# Patient Record
Sex: FEMALE
[De-identification: names, ages, dates, MRNs, and addresses within clinical notes are randomized per-mention and may not be internally consistent; named-entity substitution may affect disease eponyms.]

---

## 2022-03-11 PROBLEM — Z00.00 ENCOUNTER FOR PREVENTIVE HEALTH EXAMINATION: Status: ACTIVE | Noted: 2022-03-11

## 2022-03-17 ENCOUNTER — APPOINTMENT (OUTPATIENT)
Dept: ORTHOPEDIC SURGERY | Facility: CLINIC | Age: 67
End: 2022-03-17
Payer: COMMERCIAL

## 2022-03-17 VITALS
HEIGHT: 66 IN | BODY MASS INDEX: 28.93 KG/M2 | DIASTOLIC BLOOD PRESSURE: 67 MMHG | SYSTOLIC BLOOD PRESSURE: 168 MMHG | HEART RATE: 71 BPM | WEIGHT: 180 LBS

## 2022-03-17 DIAGNOSIS — Z78.9 OTHER SPECIFIED HEALTH STATUS: ICD-10-CM

## 2022-03-17 DIAGNOSIS — Z80.0 FAMILY HISTORY OF MALIGNANT NEOPLASM OF DIGESTIVE ORGANS: ICD-10-CM

## 2022-03-17 DIAGNOSIS — Z86.79 PERSONAL HISTORY OF OTHER DISEASES OF THE CIRCULATORY SYSTEM: ICD-10-CM

## 2022-03-17 PROCEDURE — 99204 OFFICE O/P NEW MOD 45 MIN: CPT

## 2022-03-17 NOTE — PHYSICAL EXAM
[Slightly Antalgic] : slightly antalgic [LE] : Sensory: Intact in bilateral lower extremities [DP] : dorsalis pedis 2+ and symmetric bilaterally [PT] : posterior tibial 2+ and symmetric bilaterally [Normal] : Alert and in no acute distress [Poor Appearance] : well-appearing [Acute Distress] : not in acute distress [Obese] : not obese [de-identified] : The patient has no respiratory distress. Mood and affect are normal. The patient is alert and oriented to person, place and time.\par There is no pain with active or passive motion of the hips.  There is no tenderness of either hip.  There is an anterior scar at the right knee.  Examination of the left knee demonstrates a varus alignment.  There is tenderness medially, laterally and anteriorly.  The patient has quadriceps and hamstring weakness.  Range of motion is very painful with an arc of 10 to 60 degrees.  When she is sitting up her knee will flex to 90 degrees but she has pain when attempting to do this in the supine position.  The calves are soft and nontender.  The skin is intact.  There is no lymphedema. [de-identified] : The patient presents with AP, lateral, tunnel and sunrise x-rays of the left knee taken February 15, 2022.  These x-rays are reviewed.  The patient has severe tricompartmental osteoarthritic arthritis with varus deformity.  There are no fractures or dislocations.\par

## 2022-03-17 NOTE — HISTORY OF PRESENT ILLNESS
[de-identified] : This is a 66 year old female who presents with over a year of left knee pain.   Stair negotiation and walking is very difficult.  She can barely walk a 1/2 block secondary to knee pain.  She does not take any pain medications for this. She has not done any treatments for her knee.  She had right total knee replacement performed 10 or 12 years ago.  She is not sure who the surgeon was.  Surgery was performed in [place].

## 2022-03-17 NOTE — DISCUSSION/SUMMARY
[de-identified] : The patient has moderate to severe osteoarthritis of the left knee.  She has had no treatment for this condition.  She had right total knee replacement in the past.  I have discussed the pathology and natural history with her.  Treatment options were discussed.  She has weakness of her leg and would benefit from a physical therapy program for muscle strengthening.  In addition she is started on a course of diclofenac.  Medication risks have been reviewed.  I would like to reevaluate her in 1 month.  If pain persists she may be a candidate for arthroplasty although I think she needs to try some nonoperative treatment first.  She will require further x-rays in the future both of the right knee which had an arthroplasty and are usual series for the left knee.

## 2022-04-13 ENCOUNTER — RX RENEWAL (OUTPATIENT)
Age: 67
End: 2022-04-13

## 2022-04-13 RX ORDER — DICLOFENAC SODIUM 50 MG/1
50 TABLET, DELAYED RELEASE ORAL TWICE DAILY
Qty: 60 | Refills: 0 | Status: ACTIVE | COMMUNITY
Start: 2022-03-17 | End: 1900-01-01

## 2022-04-19 ENCOUNTER — APPOINTMENT (OUTPATIENT)
Dept: ORTHOPEDIC SURGERY | Facility: CLINIC | Age: 67
End: 2022-04-19
Payer: COMMERCIAL

## 2022-04-19 VITALS — HEIGHT: 66 IN | BODY MASS INDEX: 28.93 KG/M2 | WEIGHT: 180 LBS

## 2022-04-19 PROCEDURE — 99213 OFFICE O/P EST LOW 20 MIN: CPT

## 2022-04-19 NOTE — PHYSICAL EXAM
[Slightly Antalgic] : slightly antalgic [LE] : Sensory: Intact in bilateral lower extremities [DP] : dorsalis pedis 2+ and symmetric bilaterally [PT] : posterior tibial 2+ and symmetric bilaterally [Normal] : Alert and in no acute distress [Poor Appearance] : well-appearing [Acute Distress] : not in acute distress [Obese] : not obese [de-identified] : The patient has no respiratory distress. Mood and affect are normal. The patient is alert and oriented to person, place and time.\par There is no pain with active or passive motion of the hips.  There is no tenderness of either hip.  There is an anterior scar at the right knee.  Examination of the left knee demonstrates a varus alignment.  There is tenderness medially, laterally and anteriorly.  There is no instability of collateral or cruciate ligaments.  Range of motion 0 to 90 degrees left and right.  The calves are soft and nontender.  The skin is intact.  There is no lymphedema.

## 2022-04-19 NOTE — DISCUSSION/SUMMARY
[de-identified] : The patient has had some improvement in strength and range of motion with physical therapy.  She would like to continue the physical therapy program.  She does not feel the medication is helping and will stop diclofenac.  She will be reevaluated in 6 weeks.  If she does decide to have knee replacement surgery she will need a full set of x-rays of both knees.

## 2022-04-19 NOTE — HISTORY OF PRESENT ILLNESS
[de-identified] : This is a 66 year old female who presents for follow up of left knee arthritis.  She had right total knee replacement performed 10 or 12 years ago. Has been participating with PT twice a week, and she states that she is having some improvement with physical therapy.  Her leg feels stronger.  Pain is still aggravated with walking. She also took diclofenac which also only provides temporary relief.

## 2022-05-25 ENCOUNTER — RX RENEWAL (OUTPATIENT)
Age: 67
End: 2022-05-25

## 2022-05-31 ENCOUNTER — APPOINTMENT (OUTPATIENT)
Dept: ORTHOPEDIC SURGERY | Facility: CLINIC | Age: 67
End: 2022-05-31
Payer: COMMERCIAL

## 2022-05-31 VITALS — HEIGHT: 66 IN | BODY MASS INDEX: 28.93 KG/M2 | WEIGHT: 180 LBS

## 2022-05-31 PROCEDURE — 99214 OFFICE O/P EST MOD 30 MIN: CPT

## 2022-05-31 PROCEDURE — 73564 X-RAY EXAM KNEE 4 OR MORE: CPT | Mod: LT

## 2022-05-31 RX ORDER — DICLOFENAC SODIUM 75 MG/1
75 TABLET, DELAYED RELEASE ORAL
Qty: 180 | Refills: 0 | Status: ACTIVE | COMMUNITY
Start: 2022-05-31 | End: 1900-01-01

## 2022-05-31 RX ORDER — LOSARTAN POTASSIUM 50 MG/1
50 TABLET, FILM COATED ORAL
Qty: 30 | Refills: 0 | Status: ACTIVE | COMMUNITY
Start: 2022-04-25

## 2022-05-31 RX ORDER — LOSARTAN POTASSIUM 25 MG/1
25 TABLET, FILM COATED ORAL
Qty: 30 | Refills: 0 | Status: ACTIVE | COMMUNITY
Start: 2022-01-24

## 2022-05-31 RX ORDER — ATORVASTATIN CALCIUM 20 MG/1
20 TABLET, FILM COATED ORAL
Qty: 30 | Refills: 0 | Status: ACTIVE | COMMUNITY
Start: 2022-01-24

## 2022-05-31 RX ORDER — AMLODIPINE BESYLATE 5 MG/1
5 TABLET ORAL
Qty: 30 | Refills: 0 | Status: ACTIVE | COMMUNITY
Start: 2022-02-21

## 2022-05-31 RX ORDER — CLOBETASOL PROPIONATE 0.5 MG/G
0.05 CREAM TOPICAL
Qty: 30 | Refills: 0 | Status: ACTIVE | COMMUNITY
Start: 2022-01-25

## 2022-05-31 NOTE — PHYSICAL EXAM
[Slightly Antalgic] : slightly antalgic [LE] : Sensory: Intact in bilateral lower extremities [DP] : dorsalis pedis 2+ and symmetric bilaterally [PT] : posterior tibial 2+ and symmetric bilaterally [Normal] : Alert and in no acute distress [Poor Appearance] : well-appearing [Acute Distress] : not in acute distress [Obese] : not obese [de-identified] : The patient has no respiratory distress. Mood and affect are normal. The patient is alert and oriented to person, place and time.\par There is no pain with active or passive motion of the hips.  There is no tenderness of either hip.  There is an anterior scar at the right knee.  Examination of the left knee demonstrates a varus alignment.  There is tenderness medially, laterally and anteriorly.  There is no instability of collateral or cruciate ligaments.  Range of motion 0 to 90 degrees left and right.  The calves are soft and nontender.  The skin is intact.  There is no lymphedema. [de-identified] : AP, lateral, tunnel and sunrise x-rays of the left knee taken today demonstrate moderate to severe tricompartmental osteoarthritis.  There are no fractures or dislocations.

## 2022-05-31 NOTE — DISCUSSION/SUMMARY
[de-identified] : The patient has had an exacerbation of her arthritis.  She has painful motion and weakness of her knee.  The pathology, natural history and treatment options were discussed.  She would like to resume physical therapy.  She will continue diclofenac.  Medication risks have been reviewed.  She will be reevaluated in 6 weeks.

## 2022-05-31 NOTE — HISTORY OF PRESENT ILLNESS
[de-identified] : 66 year old female with history of right TKR presents for reevaluation of her left knee.  She had been doing well until this past weekend. She woke up with pain behind the left knee which she has never had before. She has had great difficulty bending her knee. She is walking with a straight left because of pain. She is taking diclofenac as needed for pain. She finished PT one month ago. She reports having some pain relief while in therapy. She has continued with a home exercise program.

## 2022-08-04 ENCOUNTER — APPOINTMENT (OUTPATIENT)
Dept: ORTHOPEDIC SURGERY | Facility: CLINIC | Age: 67
End: 2022-08-04

## 2022-08-04 VITALS — HEIGHT: 66 IN | BODY MASS INDEX: 28.93 KG/M2 | WEIGHT: 180 LBS

## 2022-08-04 PROCEDURE — 99213 OFFICE O/P EST LOW 20 MIN: CPT

## 2022-08-04 NOTE — PHYSICAL EXAM
[Slightly Antalgic] : slightly antalgic [LE] : Sensory: Intact in bilateral lower extremities [DP] : dorsalis pedis 2+ and symmetric bilaterally [PT] : posterior tibial 2+ and symmetric bilaterally [Normal] : Alert and in no acute distress [Poor Appearance] : well-appearing [Acute Distress] : not in acute distress [Obese] : not obese [de-identified] : The patient has no respiratory distress. Mood and affect are normal. The patient is alert and oriented to person, place and time.\par There is no pain with active or passive motion of the hips.  There is no tenderness of either hip.  There is an anterior scar at the right knee.  Examination of the left knee demonstrates a varus alignment.  There is tenderness medially, laterally and anteriorly.  There is no instability of collateral or cruciate ligaments.  Range of motion 0 to 90 degrees left and right.  The calves are soft and nontender.  The skin is intact.  There is no lymphedema.  Strength has significantly improved since first visit.

## 2022-08-04 NOTE — DISCUSSION/SUMMARY
[de-identified] : The patient has left knee osteoarthritis.  She is improving with physical therapy.  She still does have pain and limps when she walks.  I recommend another month of physical therapy.  If strength is adequate at that point and she feels that the pain warrant surgery we will talk about knee replacement.

## 2022-08-04 NOTE — HISTORY OF PRESENT ILLNESS
[de-identified] : The patient presents for reevaluation of her left knee. She feels her knee pain is unchanged from her last visit. She has been doing PT 2x per week with some relief. She states her physical therapist recommended she continue PT to improve her strength. She complains of pain with walking, better at rest. She finds some relief with heat. She was prescribed Diclofenac but she feels this does not relieve her pain. She is interested in continuing PT to strengthen her muscles prior to considering knee replacement.

## 2022-09-06 ENCOUNTER — RX RENEWAL (OUTPATIENT)
Age: 67
End: 2022-09-06

## 2022-09-19 ENCOUNTER — APPOINTMENT (OUTPATIENT)
Dept: ORTHOPEDIC SURGERY | Facility: CLINIC | Age: 67
End: 2022-09-19

## 2022-09-19 VITALS
WEIGHT: 180 LBS | SYSTOLIC BLOOD PRESSURE: 136 MMHG | HEIGHT: 66 IN | HEART RATE: 64 BPM | BODY MASS INDEX: 28.93 KG/M2 | DIASTOLIC BLOOD PRESSURE: 80 MMHG

## 2022-09-19 DIAGNOSIS — M17.12 UNILATERAL PRIMARY OSTEOARTHRITIS, LEFT KNEE: ICD-10-CM

## 2022-09-19 DIAGNOSIS — Z96.651 PRESENCE OF RIGHT ARTIFICIAL KNEE JOINT: ICD-10-CM

## 2022-09-19 PROCEDURE — 99214 OFFICE O/P EST MOD 30 MIN: CPT

## 2022-09-19 PROCEDURE — 73564 X-RAY EXAM KNEE 4 OR MORE: CPT | Mod: RT

## 2022-09-19 NOTE — HISTORY OF PRESENT ILLNESS
[de-identified] : The patient presents for reevaluation of her left knee. She continues to experience persistent left knee pain, worse with ambulation. She has been participating in PT with good improvement of her strength. She would like to have a discussion about her candidacy for total knee replacement.

## 2022-09-19 NOTE — PHYSICAL EXAM
[Slightly Antalgic] : slightly antalgic [LE] : Sensory: Intact in bilateral lower extremities [DP] : dorsalis pedis 2+ and symmetric bilaterally [PT] : posterior tibial 2+ and symmetric bilaterally [Normal] : Alert and in no acute distress [Poor Appearance] : well-appearing [Acute Distress] : not in acute distress [Obese] : not obese [de-identified] : The patient has no respiratory distress. Mood and affect are normal. The patient is alert and oriented to person, place and time.\par There is no pain with active or passive motion of the hips.  There is no tenderness of either hip.  There is an anterior scar at the right knee.  Examination of the left knee demonstrates a varus alignment.  There is tenderness medially, laterally and anteriorly.  There is no instability of collateral or cruciate ligaments.  Range of motion 0 to 90 degrees left and right.  The calves are soft and nontender.  The skin is intact.  There is no lymphedema.  Strength has significantly improved since first visit. [de-identified] : AP, lateral, tunnel and sunrise x-rays of both knees taken today demonstrate severe degenerative changes of the left knee.  This is tricompartmental disease.  AP, lateral, tunnel and sunrise x-rays of the right knee demonstrate previous total knee replacement in position.

## 2022-09-19 NOTE — DISCUSSION/SUMMARY
[de-identified] : The patient has symptomatic osteoarthritis of the left knee.  She has had some success in rebuilding her strength with physical therapy.  I think she is a candidate for knee replacement at this time.  I have discussed the procedure, pre and postoperative routines, risk, benefits and alternatives.  The patient understands and wishes to proceed.  She will speak to the scheduling office regarding left total knee replacement.